# Patient Record
Sex: MALE | Race: WHITE | URBAN - NONMETROPOLITAN AREA
[De-identification: names, ages, dates, MRNs, and addresses within clinical notes are randomized per-mention and may not be internally consistent; named-entity substitution may affect disease eponyms.]

---

## 2020-11-02 ENCOUNTER — OFFICE VISIT (OUTPATIENT)
Dept: FAMILY MEDICINE CLINIC | Age: 40
End: 2020-11-02

## 2020-11-02 VITALS
HEART RATE: 101 BPM | TEMPERATURE: 97.5 F | HEIGHT: 69 IN | SYSTOLIC BLOOD PRESSURE: 134 MMHG | BODY MASS INDEX: 28.08 KG/M2 | OXYGEN SATURATION: 98 % | DIASTOLIC BLOOD PRESSURE: 82 MMHG | WEIGHT: 189.6 LBS

## 2020-11-02 PROCEDURE — 99213 OFFICE O/P EST LOW 20 MIN: CPT | Performed by: FAMILY MEDICINE

## 2020-11-02 RX ORDER — AMOXICILLIN AND CLAVULANATE POTASSIUM 875; 125 MG/1; MG/1
1 TABLET, FILM COATED ORAL 2 TIMES DAILY
Qty: 14 TABLET | Refills: 0 | Status: SHIPPED | OUTPATIENT
Start: 2020-11-02 | End: 2020-11-09

## 2020-11-02 NOTE — PROGRESS NOTES
OFFICE NOTE    11/2/20  Name: Maricel Loja  [facility]:[birth year]   Sex:male   Age:40 y.o. SUBJECTIVE  Chief Complaint   Patient presents with    Animal Bite     last night, own dog       HPI Reports bit on left thumb last night. Scrubbed it, made it bleed, used peroxide and mucopirocin. Dog said he was sorry. Own animal, vaccinated, house dog    Review of Systems  No fever chills.  Red streaking, tetanus current at 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    Fracture, mandible ([facility] [address] 06/20/2015    History of hyperbaric oxygen therapy 09/2015    right and left neck wounds    Neck abscess     PONV (postoperative nausea and vomiting)     Prolonged emergence from general anesthesia     aggravated waking     Past Surgical History:   Procedure Laterality Date    FRACTURE SURGERY      Right leg    MANDIBLE FRACTURE SURGERY Bilateral 6/26/15    bilateral orif    OTHER SURGICAL HISTORY  08/06/15    removal of hardware, removal of hybrid plate    OTHER SURGICAL HISTORY Left 9/22/15    REMOVAL HARDWARE I & D LEFT NECK     Family History   Family history unknown: Yes     Social History     Tobacco History     Smoking Status  Never Smoker    Smokeless Tobacco Use  Former User Quit date  6/20/2015 Smokeless Tobacco Type  Chew          Alcohol History     Alcohol Use Status  Yes Drinks/Week  0 Standard drinks or equivalent per week Amount  0.0 standard drinks of alcohol/wk Comment  social          Drug Use     Drug Use Status  No          Sexual Activity     Sexually Active  Yes Partners  Female                OBJECTIVE  Vitals:    11/02/20 1353   BP: 134/82   Site: Left Upper Arm   Position: Sitting   Pulse: 101   Temp: 97.5 °F (36.4 °C)   TempSrc: Temporal SpO2: 98%   Weight: 189 lb 9.6 oz (86 kg)   Height: 5' 9\" (1.753 m)        Body mass index is 28 kg/m². No orders of the defined types were placed in this encounter. EXAM   Physical Exam  Vitals signs and nursing note reviewed. Constitutional:       Appearance: Normal appearance. He is normal weight. Musculoskeletal:      Comments: Small bit wound near MCP left thumb. No drainage, minimal redness, no swelling or warmth   Neurological:      Mental Status: He is alert. Dorota Adams was seen today for animal bite. 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 Wound looks pretty good but not at 24 hours yet. Will treat with Augmentin 875 bid x 7days with [place] or probiotic. Warning signs discussed. Tetanus current per patient      No follow-ups on file.     Electronically signed by Ang Clement MD on 11/2/20 at 1:56 PM EST

## 2020-11-03 ENCOUNTER — TELEPHONE (OUTPATIENT)
Dept: FAMILY MEDICINE CLINIC | Age: 40
End: 2020-11-03

## 2020-11-03 NOTE — TELEPHONE ENCOUNTER
Yes he probably should. [facility] [employer] [facility]  Concern would be if the infection is in the joiint

## 2020-11-03 NOTE — TELEPHONE ENCOUNTER
Patient calling you saw him yesterday for a dog bite. He did start the atb. Today the wound is red and opened back up. There is pus coming out. Should he be concerned?

## 2022-07-18 ENCOUNTER — OFFICE VISIT (OUTPATIENT)
Dept: FAMILY MEDICINE CLINIC | Age: 42
End: 2022-07-18

## 2022-07-18 VITALS
BODY MASS INDEX: 28.17 KG/M2 | WEIGHT: 190.2 LBS | HEART RATE: 68 BPM | OXYGEN SATURATION: 97 % | TEMPERATURE: 97.5 F | HEIGHT: 69 IN | RESPIRATION RATE: 18 BRPM | SYSTOLIC BLOOD PRESSURE: 112 MMHG | DIASTOLIC BLOOD PRESSURE: 76 MMHG

## 2022-07-18 DIAGNOSIS — H61.23 BILATERAL IMPACTED CERUMEN: Primary | ICD-10-CM

## 2022-07-18 PROCEDURE — 69210 REMOVE IMPACTED EAR WAX UNI: CPT | Performed by: STUDENT IN AN ORGANIZED HEALTH CARE EDUCATION/TRAINING PROGRAM

## 2022-07-18 PROCEDURE — 99213 OFFICE O/P EST LOW 20 MIN: CPT | Performed by: STUDENT IN AN ORGANIZED HEALTH CARE EDUCATION/TRAINING PROGRAM

## 2022-07-18 RX ORDER — LEVOCETIRIZINE DIHYDROCHLORIDE 5 MG/1
5 TABLET, FILM COATED ORAL NIGHTLY
COMMUNITY

## 2022-07-18 ASSESSMENT — ENCOUNTER SYMPTOMS
RHINORRHEA: 0
NAUSEA: 0
VOMITING: 0

## 2022-07-18 ASSESSMENT — PATIENT HEALTH QUESTIONNAIRE - PHQ9
SUM OF ALL RESPONSES TO PHQ QUESTIONS 1-9: 0
SUM OF ALL RESPONSES TO PHQ9 QUESTIONS 1 & 2: 0
SUM OF ALL RESPONSES TO PHQ QUESTIONS 1-9: 0
1. LITTLE INTEREST OR PLEASURE IN DOING THINGS: 0
2. FEELING DOWN, DEPRESSED OR HOPELESS: 0

## 2022-07-18 NOTE — PROGRESS NOTES
Alek Martin (:  1980) is a 40 y.o. male, here for evaluation of the following chief complaint(s):  Ear Fullness (Patient states that he has bilateral ear fullness his left and right ear more so left ear. . cleaned out in shower this AM had a lot of wax come out then his ear felt plugged can not hear out of )         ASSESSMENT/PLAN:  1. Bilateral impacted cerumen  -     14562 - CT REMOVE IMPACTED EAR WAX  -Worse on the L, was able to completely remove cerumen on the R side. However some impaction remained on the L and was not able to completely remove on that side. Will rx debrox to loosen. Let us know if things are worsening. Advised against using q tips  No follow-ups on file. Subjective   SUBJECTIVE/OBJECTIVE:  Ear plugged  -L sided   -Happened after the shower this morning, was able to get some wax and and then pushed some back in  -has a deacrease in hearing  -no fever, no pain  -no pain behind the ear  -occasionally uses q tips      Review of Systems   Constitutional:  Negative for chills and fever. HENT:  Positive for hearing loss. Negative for congestion and rhinorrhea. Cardiovascular:  Negative for chest pain and leg swelling. Gastrointestinal:  Negative for nausea and vomiting. Musculoskeletal:  Negative for arthralgias and myalgias. Skin:  Negative for rash and wound. Neurological:  Negative for dizziness and light-headedness. Objective   Physical Exam  Vitals reviewed. Constitutional:       General: He is not in acute distress. HENT:      Head: Normocephalic and atraumatic. Right Ear: There is impacted cerumen. Left Ear: There is impacted cerumen. Ears:      Comments: Impacted cerumen bilaterally. Was able to visualize a normal TM on the R after disimpaction. Eyes:      Extraocular Movements: Extraocular movements intact. Conjunctiva/sclera: Conjunctivae normal.   Cardiovascular:      Rate and Rhythm: Normal rate and regular rhythm. Pulmonary:      Effort: Pulmonary effort is normal. No respiratory distress. Neurological:      General: No focal deficit present. Mental Status: He is alert and oriented to person, place, and time. An electronic signature was used to authenticate this note.     --Frances Delaney MD